# Patient Record
Sex: FEMALE | Race: WHITE | Employment: FULL TIME | URBAN - METROPOLITAN AREA
[De-identification: names, ages, dates, MRNs, and addresses within clinical notes are randomized per-mention and may not be internally consistent; named-entity substitution may affect disease eponyms.]

---

## 2019-04-17 PROBLEM — R32 URINARY INCONTINENCE: Status: ACTIVE | Noted: 2019-04-17

## 2019-04-17 PROBLEM — N39.3 FEMALE STRESS INCONTINENCE: Status: ACTIVE | Noted: 2018-12-24

## 2019-04-17 PROBLEM — E78.2 MIXED HYPERLIPIDEMIA: Status: ACTIVE | Noted: 2018-06-18

## 2019-04-17 PROBLEM — E87.5 HYPERKALEMIA: Status: ACTIVE | Noted: 2017-06-26

## 2019-04-17 PROBLEM — Z87.898 HISTORY OF URINARY FREQUENCY: Status: ACTIVE | Noted: 2019-04-17

## 2019-04-17 PROBLEM — R25.2 LEG CRAMPS: Status: ACTIVE | Noted: 2017-06-29

## 2019-07-16 ENCOUNTER — HOSPITAL ENCOUNTER (OUTPATIENT)
Dept: PHYSICAL THERAPY | Age: 69
Discharge: HOME | End: 2019-07-16
Payer: MEDICARE

## 2019-07-16 PROCEDURE — 97530 THERAPEUTIC ACTIVITIES: CPT

## 2019-07-16 PROCEDURE — 97161 PT EVAL LOW COMPLEX 20 MIN: CPT

## 2019-07-16 NOTE — PROGRESS NOTES
Dari Laird 30  [facility] AT [address] [facility], [address], [place] [address] - Phone: [phone]  Fax: [phone] / [address] [facility]  Patient Name: Dorie Winn : 1950   Medical   Diagnosis: Incontinence [R32] Treatment Diagnosis: incontinence   Onset Date: 7+ years ago     Referral Source: Arpit Rapp MD Start of Care [facility]): 2019   Prior Hospitalization: See medical history Provider #: [id number]   Prior Level of Function: Manageable symptoms   Comorbidities: Asthma, osteopenia   Medications: Verified on Patient Summary List   The Plan of Care and following information is based on the information from the initial evaluation.   ==================================================================================  Assessment / key information: Patient is a 70 y.o. female  with vaginal deliveries who presents to [facility] with diagnosis of Incontinence [R32]. Patient reports urinary incontinence with laughing, sneezing, activity on a full bladder. Patient wears pantiliners daily for protection. Patient demonstrates 50% WNL lumbar AROM in standing, with SLS EO B <5 sec, but (-) for trendelenberg. Decreased length B psoas, 3 to 3+/5 strength B hips. Patient deferred pelvic floor internal examination and biofeedback to another time. She was educated thoroughly in bladder diaries and the course of physical therapy interventions. Patient scored 57 on FOTO/Urinary Problem indicating decreased quality of life.   Patient can benefit from PT to increase pelvic floor muscle strength, decrease urinary incontinence, urgency and nocturia.    ==================================================================================  Eval Complexity: History: LOW Complexity : Zero comorbidities / personal factors that will impact the outcome / POCExam:HIGH Complexity : 4+ Standardized tests and measures addressing body structure, function, activity limitation and / or participation in recreation  Presentation: LOW Complexity : Stable, uncomplicated  Clinical Decision Making:MEDIUM Complexity : FOTO score of 26-74Overall Complexity:LOW   Problem List: Pelvic pain/dysfunction, Decreased pelvic floor mm awareness, Decreased pelvic floor mm strength, Use of accessory muscles, Improper voiding habits, Hypertonus of pelvic floor and Urinary urgency  Treatment Plan may include any combination of the following: Therapeutic exercise, Urge suppression techniques, Neuromuscular re-education, Manual therapy, Physical agent/modality and Patient education  Patient / Family readiness to learn indicated by: asking questions, trying to perform skills and interest  Persons(s) to be included in education: patient (P)  Barriers to Learning/Limitations: None  Measures taken, if barriers to learning: NA   Patient Goal (s): \"better control\"   Patient self reported health status: excellent  Rehabilitation Potential: excellent  Short Term Goals: To be accomplished in 4-6 weeks:   1. Patient is compliant with all HEP instructions. 2. Patient will report > or = 25% subjective improvement in urinary incontinence with ADLs. 3. Assess PERF and rest and working tone of pelvic floor muscles via biofeedback and set goals accordingly. 4. Patient able to delay urination > or = 1 hour. Long Term Goals: To be accomplished in 8-12 weeks:   1. Patient independent in HEP in preparation for discharge. 2. Patient will increase score on FOTO/Urinary Problem to > 65 indicating improved continence and quality of life. 3. Patient to report > or = 50% improvement in urinary incontinence with ADLs. 4. Nocturia decreased to 1x nightly.   Frequency / Duration:   Patient to be seen  1  times per week for 8-12  weeks:  Patient / Caregiver education and instruction:Proper Voiding Habits, Diet, Pain Management, Exercises and Bladder Retraining  Therapist Signature: Trena Easton, PT, DPT, MTC, CMTPT Date: 2/69/8388   Certification Period: NA Time: 4:44 PM   ==================================================================================  I certify that the above Physical Therapy Services are being furnished while the patient is under my care. I agree with the treatment plan and certify that this therapy is necessary. Physician Signature:        Date:       Time:     Please sign and return to [facility] or you may fax the signed copy to [phone]. Thank you.

## 2019-07-16 NOTE — PROGRESS NOTES
PHYSICAL THERAPY - DAILY TREATMENT NOTE     Patient Name: Hilary Acosta        Date: 2019  : 1950   YES Patient  Verified  Visit #:   1     Insurance: Payor: Mike Andrade / Plan: [place] B / Product Type: Medicare /      In time: 405 Out time: 445   Total Treatment Time: 40     Medicare/BCBS Time Tracking (below)   Total Timed Codes (min):  15 1:1 Treatment Time:  15     TREATMENT AREA =  Incontinence [R32]    SUBJECTIVE    Pain Level (on 0 to 10 scale):  0  / 10   Medication Changes/New allergies or changes in medical history, any new surgeries or procedures? NO    If yes, update Summary List   Subjective Functional Status/Changes:  []  No changes reported       Functional improvements: see POC  Functional impairments: see POC         OBJECTIVE    15 min Therapeutic Activity: [x]  See flow sheet   Rationale:      increase ROM, increase strength, improve coordination and increase proprioception to improve the patients ability to perform ADLs without incontinence       Billed With/As:   [] TE   [] TA   [] Neuro   [] Self Care Patient Education: [x] Review HEP    [] Progressed/Changed HEP based on:   [] positioning   [] body mechanics   [] transfers   [] heat/ice application    [] other:        Other Objective/Functional Measures:    See POC. Post Treatment Pain Level (on 0 to 10) scale:   0  / 10     ASSESSMENT    Assessment/Changes in Function:     See POC. []  See Progress Note/Recertification   Patient will continue to benefit from skilled PT services to modify and progress therapeutic interventions, address functional mobility deficits, address ROM deficits, address strength deficits, analyze and address soft tissue restrictions, analyze and cue movement patterns, analyze and modify body mechanics/ergonomics, assess and modify postural abnormalities and instruct in home and community integration to attain remaining goals.       Progress toward goals / Updated goals:    See POC     PLAN    [x]  Upgrade activities as tolerated YES Continue plan of care   []  Discharge due to :    []  Other:      Therapist: Wesly Wilson, PT, DPT, MTC, CMTPT    Date: 7/16/2019 Time: 4:44 PM     Future Appointments   Date Time Provider Kathie Camacho   8/6/2019  2:30 PM [facility]   8/13/2019  3:15 PM [facility]   8/20/2019  2:30 PM [facility]   12/6/2019  8:00 AM Al Wilkins MD [place]

## 2019-08-06 ENCOUNTER — APPOINTMENT (OUTPATIENT)
Dept: PHYSICAL THERAPY | Age: 69
End: 2019-08-06

## 2019-08-13 ENCOUNTER — APPOINTMENT (OUTPATIENT)
Dept: PHYSICAL THERAPY | Age: 69
End: 2019-08-13

## 2019-08-20 ENCOUNTER — APPOINTMENT (OUTPATIENT)
Dept: PHYSICAL THERAPY | Age: 69
End: 2019-08-20

## 2022-02-10 PROBLEM — R07.9 CHEST PAIN: Status: ACTIVE | Noted: 2020-02-28
